# Patient Record
Sex: MALE
[De-identification: names, ages, dates, MRNs, and addresses within clinical notes are randomized per-mention and may not be internally consistent; named-entity substitution may affect disease eponyms.]

---

## 2024-03-13 PROBLEM — Z00.00 ENCOUNTER FOR PREVENTIVE HEALTH EXAMINATION: Status: ACTIVE | Noted: 2024-03-13

## 2024-03-15 ENCOUNTER — APPOINTMENT (OUTPATIENT)
Dept: ORTHOPEDIC SURGERY | Facility: CLINIC | Age: 34
End: 2024-03-15
Payer: COMMERCIAL

## 2024-03-15 VITALS — WEIGHT: 180 LBS | BODY MASS INDEX: 29.99 KG/M2 | HEIGHT: 65 IN

## 2024-03-15 DIAGNOSIS — E78.00 PURE HYPERCHOLESTEROLEMIA, UNSPECIFIED: ICD-10-CM

## 2024-03-15 DIAGNOSIS — M77.11 LATERAL EPICONDYLITIS, RIGHT ELBOW: ICD-10-CM

## 2024-03-15 DIAGNOSIS — M77.10 LATERAL EPICONDYLITIS, UNSPECIFIED ELBOW: ICD-10-CM

## 2024-03-15 PROCEDURE — 73080 X-RAY EXAM OF ELBOW: CPT | Mod: RT

## 2024-03-15 PROCEDURE — 99203 OFFICE O/P NEW LOW 30 MIN: CPT

## 2024-03-15 RX ORDER — MELOXICAM 15 MG/1
15 TABLET ORAL
Qty: 30 | Refills: 0 | Status: ACTIVE | COMMUNITY
Start: 2024-03-15 | End: 1900-01-01

## 2024-03-15 NOTE — IMAGING
[de-identified] :  R elbow: - No obvious swelling, deformity, bruising -Pain with palpation slightly distal to the lateral epicondyle - No pain with palpation over olecranon, radial head, medial epicondyle, or lateral epicondyle - ROM intact throughout flexion, extension, supination, pronation - 5/5 strength with elbow flexion, extension, supination, pronation. 5/5 strength with wrist flexion, extension.  Pain with wrist extension -Pain with resisted middle finger extension - No laxity with varus or valgus stressing - Distally neurovascularly intact   L elbow: - No obvious swelling, deformity, bruising - No pain with palpation over olecranon, radial head, medial epicondyle, or lateral epicondyle - ROM intact throughout flexion, extension, supination, pronation - 5/5 strength with elbow flexion, extension, supination, pronation. 5/5 strength with wrist flexion, extension  - No laxity with varus or valgus stressing - Distally neurovascularly intact   [Right] : right elbow [There are no fractures, subluxations or dislocations. No significant abnormalities are seen] : There are no fractures, subluxations or dislocations. No significant abnormalities are seen

## 2024-03-15 NOTE — HISTORY OF PRESENT ILLNESS
[de-identified] : 03/15/2024: Patient is a 34 yo male right-hand-dominant presenting for evaluation of right elbow pain starting 2 weeks ago with no prior injury.  States pain has stayed the same since it started.  Located over the lateral aspect of his elbow.  He denies any pain at rest.  He has pain with lifting heavy things and heavy chores.  Denies any radiation of pain.  He tried Tylenol once.  Has never had anything like this in the past.  He denies any weakness or paresthesias.

## 2024-03-15 NOTE — ASSESSMENT
[FreeTextEntry1] : 2 weeks of right elbow pain intermittently over the lateral epicondyle.  Exam consistent with lateral epicondylitis.  X-rays obtained without any acute or degenerative changes of the elbow. - Meloxicam once a day as needed - Recommended use of wrist brace at night and with heavy lifting to help rest the extensors - Can also use counterforce brace that he already purchased - Recommend use of Tylenol ice as needed - Follow-up in 6 to 8 weeks if needed

## 2024-03-15 NOTE — PHYSICAL EXAM
[de-identified] : Constitutional: Well developed, well nourished, able to communicate Neuro: Normal sensation, No focal deficits Skin: Intact CV: Peripheral vascular exam grossly normal Pulm: No signs of respiratory distress Psych: Oriented, normal mood and affect